# Patient Record
(demographics unavailable — no encounter records)

---

## 2024-12-11 NOTE — HISTORY OF PRESENT ILLNESS
[FreeTextEntry1] : 52y/o man kidney stones, LUTS  Renal ultrasound today: No stones bilaterally 24-hour urine: low vol, still high sodium/UUN/PCR/UA/SSUA  CT sept 2024 [facility] Radiology: no stones bilaterally   RELEVANT PAST HISTORY Hx of LEFT PCNL and antegrade LEFT ureteroscopy on 7/17/2018 Stones = 100% uric acid  He passed a small right UVJ stone in August 2021.  He required hospital visit for the pain. Stone was 100% Calcium oxalate monohydrate

## 2024-12-11 NOTE — PHYSICAL EXAM
[General Appearance - Well Developed] : well developed [General Appearance - Well Nourished] : well nourished [Normal Appearance] : normal appearance [Well Groomed] : well groomed [General Appearance - In No Acute Distress] : no acute distress [Abdomen Soft] : soft [Abdomen Tenderness] : non-tender [Costovertebral Angle Tenderness] : no ~M costovertebral angle tenderness [Skin Color & Pigmentation] : normal skin color and pigmentation [] : no respiratory distress [Affect] : the affect was normal [Mood] : the mood was normal [Not Anxious] : not anxious [Normal Station and Gait] : the gait and station were normal for the patient's age

## 2024-12-11 NOTE — ASSESSMENT
[FreeTextEntry1] : Work harder on Low-salt diet Reduce animal protein portions Consistently maintain increased fluid intake Continue allopurinol 300 mg daily Continue Potassium citrate 15 mEq 2 tablets twice a daily--Renewed ERx  24 hr urine ordered to be done before next visit Renal sono ordered to be done at or before next visit Follow up in 6 months

## 2025-06-25 NOTE — ASSESSMENT
[FreeTextEntry1] : Continue to work harder on Low-salt diet Reduce animal protein portions Consistently maintain increased fluid intake Continue allopurinol 300 mg daily Continue Potassium citrate 15 mEq 2 tablets twice a daily--Renewed ERx  24 hr urine ordered to be done before next visit Renal ultrasound ordered to be done at or before next visit Follow up in 6 months

## 2025-06-25 NOTE — HISTORY OF PRESENT ILLNESS
[FreeTextEntry1] : 53y/o man kidney stones, LUTS He is tolerating an intermittent fasting diet and is eating better He has lost about 20 pounds since his last visit He continues to try to hydrate very well He is taking over-the-counter [place] cel[place] supplement also He is also taken his potassium citrate  Renal ultrasound today: No stones bilaterally 24-hour urine : June 2025 low vol, normal sodium, normal calcium, reduced but still high UUN/PCR/SSUA, uric acid improved  CT sept 2024 [facility] Radiology: no stones bilaterally   RELEVANT PAST HISTORY Hx of LEFT PCNL and antegrade LEFT ureteroscopy on 7/17/2018 Stones = 100% uric acid  He passed a small right UVJ stone in August 2021.  He required hospital visit for the pain. Stone was 100% Calcium oxalate monohydrate